# Patient Record
Sex: FEMALE | Race: WHITE | NOT HISPANIC OR LATINO
[De-identification: names, ages, dates, MRNs, and addresses within clinical notes are randomized per-mention and may not be internally consistent; named-entity substitution may affect disease eponyms.]

---

## 2017-09-06 ENCOUNTER — APPOINTMENT (OUTPATIENT)
Dept: OBGYN | Facility: CLINIC | Age: 57
End: 2017-09-06
Payer: COMMERCIAL

## 2017-09-06 VITALS
HEIGHT: 63 IN | WEIGHT: 135 LBS | BODY MASS INDEX: 23.92 KG/M2 | DIASTOLIC BLOOD PRESSURE: 70 MMHG | SYSTOLIC BLOOD PRESSURE: 115 MMHG

## 2017-09-06 DIAGNOSIS — Z78.9 OTHER SPECIFIED HEALTH STATUS: ICD-10-CM

## 2017-09-06 PROCEDURE — 82270 OCCULT BLOOD FECES: CPT

## 2017-09-06 PROCEDURE — 81003 URINALYSIS AUTO W/O SCOPE: CPT | Mod: QW

## 2017-09-06 PROCEDURE — 99396 PREV VISIT EST AGE 40-64: CPT

## 2017-09-11 LAB
CYTOLOGY CVX/VAG DOC THIN PREP: NORMAL
HPV HIGH+LOW RISK DNA PNL CVX: NEGATIVE

## 2017-10-04 ENCOUNTER — FORM ENCOUNTER (OUTPATIENT)
Age: 57
End: 2017-10-04

## 2017-10-05 ENCOUNTER — APPOINTMENT (OUTPATIENT)
Dept: RADIOLOGY | Facility: CLINIC | Age: 57
End: 2017-10-05
Payer: COMMERCIAL

## 2017-10-05 ENCOUNTER — APPOINTMENT (OUTPATIENT)
Dept: MAMMOGRAPHY | Facility: CLINIC | Age: 57
End: 2017-10-05
Payer: COMMERCIAL

## 2017-10-05 ENCOUNTER — OUTPATIENT (OUTPATIENT)
Dept: OUTPATIENT SERVICES | Facility: HOSPITAL | Age: 57
LOS: 1 days | End: 2017-10-05
Payer: COMMERCIAL

## 2017-10-05 DIAGNOSIS — Z00.8 ENCOUNTER FOR OTHER GENERAL EXAMINATION: ICD-10-CM

## 2017-10-05 PROCEDURE — 77080 DXA BONE DENSITY AXIAL: CPT

## 2017-10-05 PROCEDURE — 77063 BREAST TOMOSYNTHESIS BI: CPT

## 2017-10-05 PROCEDURE — 77063 BREAST TOMOSYNTHESIS BI: CPT | Mod: 26

## 2017-10-05 PROCEDURE — G0202: CPT | Mod: 26

## 2017-10-05 PROCEDURE — 77080 DXA BONE DENSITY AXIAL: CPT | Mod: 26

## 2017-10-05 PROCEDURE — 77067 SCR MAMMO BI INCL CAD: CPT

## 2017-10-09 ENCOUNTER — RESULT REVIEW (OUTPATIENT)
Age: 57
End: 2017-10-09

## 2018-10-01 ENCOUNTER — APPOINTMENT (OUTPATIENT)
Dept: OBGYN | Facility: CLINIC | Age: 58
End: 2018-10-01
Payer: COMMERCIAL

## 2018-10-01 VITALS
HEART RATE: 74 BPM | HEIGHT: 63 IN | WEIGHT: 140 LBS | BODY MASS INDEX: 24.8 KG/M2 | DIASTOLIC BLOOD PRESSURE: 72 MMHG | RESPIRATION RATE: 16 BRPM | SYSTOLIC BLOOD PRESSURE: 116 MMHG

## 2018-10-01 PROCEDURE — 99396 PREV VISIT EST AGE 40-64: CPT

## 2018-10-01 PROCEDURE — 82270 OCCULT BLOOD FECES: CPT

## 2018-10-19 LAB
CYTOLOGY CVX/VAG DOC THIN PREP: NORMAL
HPV HIGH+LOW RISK DNA PNL CVX: NOT DETECTED

## 2020-08-18 ENCOUNTER — APPOINTMENT (OUTPATIENT)
Dept: OBGYN | Facility: CLINIC | Age: 60
End: 2020-08-18
Payer: COMMERCIAL

## 2020-08-18 VITALS
BODY MASS INDEX: 24.8 KG/M2 | HEIGHT: 63 IN | TEMPERATURE: 98.1 F | HEART RATE: 72 BPM | RESPIRATION RATE: 16 BRPM | OXYGEN SATURATION: 99 % | DIASTOLIC BLOOD PRESSURE: 72 MMHG | WEIGHT: 140 LBS | SYSTOLIC BLOOD PRESSURE: 110 MMHG

## 2020-08-18 DIAGNOSIS — N95.2 POSTMENOPAUSAL ATROPHIC VAGINITIS: ICD-10-CM

## 2020-08-18 DIAGNOSIS — Z01.419 ENCOUNTER FOR GYNECOLOGICAL EXAMINATION (GENERAL) (ROUTINE) W/OUT ABNORMAL FINDINGS: ICD-10-CM

## 2020-08-18 PROCEDURE — 99396 PREV VISIT EST AGE 40-64: CPT

## 2020-08-18 PROCEDURE — 82270 OCCULT BLOOD FECES: CPT

## 2020-08-18 NOTE — PHYSICAL EXAM
[Awake] : awake [Acute Distress] : no acute distress [Alert] : alert [Mass] : no breast mass [Soft] : soft [Axillary LAD] : no axillary lymphadenopathy [Nipple Discharge] : no nipple discharge [Tender] : non tender [Oriented x3] : oriented to person, place, and time [Vulvar Atrophy] : vulvar atrophy [Normal] : cervix [Atrophy] : atrophy [No Bleeding] : there was no active vaginal bleeding [Pap Obtained] : a Pap smear was performed [Uterine Adnexae] : were not tender and not enlarged [No Tenderness] : no rectal tenderness [Occult Blood] : occult blood test from digital rectal exam was negative [Nl Sphincter Tone] : normal sphincter tone

## 2020-08-18 NOTE — CHIEF COMPLAINT
[Annual Visit] : annual visit [FreeTextEntry1] : Patient is a 59-year-old female presents for routine annual gynecologic examination. This patient has no complaints. Patient's last mammogram was 2017 as was her last bone density test.

## 2020-08-19 LAB — HPV HIGH+LOW RISK DNA PNL CVX: NOT DETECTED

## 2023-01-28 ENCOUNTER — OUTPATIENT (OUTPATIENT)
Dept: OUTPATIENT SERVICES | Facility: HOSPITAL | Age: 63
LOS: 1 days | Discharge: HOME | End: 2023-01-28

## 2023-01-28 DIAGNOSIS — R22.1 LOCALIZED SWELLING, MASS AND LUMP, NECK: ICD-10-CM

## 2023-01-31 DIAGNOSIS — Z01.419 ENCOUNTER FOR GYNECOLOGICAL EXAMINATION (GENERAL) (ROUTINE) W/OUT ABNORMAL FINDINGS: ICD-10-CM

## 2023-01-31 DIAGNOSIS — N95.2 POSTMENOPAUSAL ATROPHIC VAGINITIS: ICD-10-CM

## 2023-02-01 ENCOUNTER — RESULT REVIEW (OUTPATIENT)
Age: 63
End: 2023-02-01

## 2023-02-01 ENCOUNTER — APPOINTMENT (OUTPATIENT)
Dept: HEMATOLOGY ONCOLOGY | Facility: CLINIC | Age: 63
End: 2023-02-01
Payer: COMMERCIAL

## 2023-02-01 VITALS
HEIGHT: 62.99 IN | BODY MASS INDEX: 25.06 KG/M2 | TEMPERATURE: 97.8 F | WEIGHT: 141.44 LBS | SYSTOLIC BLOOD PRESSURE: 185 MMHG | DIASTOLIC BLOOD PRESSURE: 133 MMHG | RESPIRATION RATE: 18 BRPM | HEART RATE: 99 BPM | OXYGEN SATURATION: 99 %

## 2023-02-01 DIAGNOSIS — I10 ESSENTIAL (PRIMARY) HYPERTENSION: ICD-10-CM

## 2023-02-01 DIAGNOSIS — F17.200 NICOTINE DEPENDENCE, UNSPECIFIED, UNCOMPLICATED: ICD-10-CM

## 2023-02-01 LAB
BASOPHILS # BLD AUTO: 0.06 K/UL — SIGNIFICANT CHANGE UP (ref 0–0.2)
BASOPHILS NFR BLD AUTO: 0.8 % — SIGNIFICANT CHANGE UP (ref 0–2)
EOSINOPHIL # BLD AUTO: 0.16 K/UL — SIGNIFICANT CHANGE UP (ref 0–0.5)
EOSINOPHIL NFR BLD AUTO: 2 % — SIGNIFICANT CHANGE UP (ref 0–6)
HCT VFR BLD CALC: 40 % — SIGNIFICANT CHANGE UP (ref 34.5–45)
HGB BLD-MCNC: 13.2 G/DL — SIGNIFICANT CHANGE UP (ref 11.5–15.5)
IMM GRANULOCYTES NFR BLD AUTO: 0.4 % — SIGNIFICANT CHANGE UP (ref 0–0.9)
LYMPHOCYTES # BLD AUTO: 2.44 K/UL — SIGNIFICANT CHANGE UP (ref 1–3.3)
LYMPHOCYTES # BLD AUTO: 31.1 % — SIGNIFICANT CHANGE UP (ref 13–44)
MCHC RBC-ENTMCNC: 29.3 PG — SIGNIFICANT CHANGE UP (ref 27–34)
MCHC RBC-ENTMCNC: 33 GM/DL — SIGNIFICANT CHANGE UP (ref 32–36)
MCV RBC AUTO: 88.7 FL — SIGNIFICANT CHANGE UP (ref 80–100)
MONOCYTES # BLD AUTO: 0.57 K/UL — SIGNIFICANT CHANGE UP (ref 0–0.9)
MONOCYTES NFR BLD AUTO: 7.3 % — SIGNIFICANT CHANGE UP (ref 2–14)
NEUTROPHILS # BLD AUTO: 4.58 K/UL — SIGNIFICANT CHANGE UP (ref 1.8–7.4)
NEUTROPHILS NFR BLD AUTO: 58.4 % — SIGNIFICANT CHANGE UP (ref 43–77)
NRBC # BLD: 0 /100 WBCS — SIGNIFICANT CHANGE UP (ref 0–0)
PLATELET # BLD AUTO: 301 K/UL — SIGNIFICANT CHANGE UP (ref 150–400)
RBC # BLD: 4.51 M/UL — SIGNIFICANT CHANGE UP (ref 3.8–5.2)
RBC # FLD: 12.7 % — SIGNIFICANT CHANGE UP (ref 10.3–14.5)
WBC # BLD: 7.84 K/UL — SIGNIFICANT CHANGE UP (ref 3.8–10.5)
WBC # FLD AUTO: 7.84 K/UL — SIGNIFICANT CHANGE UP (ref 3.8–10.5)

## 2023-02-01 PROCEDURE — 99205 OFFICE O/P NEW HI 60 MIN: CPT

## 2023-02-02 DIAGNOSIS — C85.10 UNSPECIFIED B-CELL LYMPHOMA, UNSPECIFIED SITE: ICD-10-CM

## 2023-02-02 DIAGNOSIS — I10 ESSENTIAL (PRIMARY) HYPERTENSION: ICD-10-CM

## 2023-02-02 PROBLEM — F17.200 SMOKER: Status: ACTIVE | Noted: 2020-08-18

## 2023-02-02 LAB
ALBUMIN SERPL ELPH-MCNC: 4.3 G/DL — SIGNIFICANT CHANGE UP (ref 3.3–5)
ALP SERPL-CCNC: 84 U/L — SIGNIFICANT CHANGE UP (ref 40–120)
ALT FLD-CCNC: 11 U/L — SIGNIFICANT CHANGE UP (ref 10–45)
ANION GAP SERPL CALC-SCNC: 11 MMOL/L — SIGNIFICANT CHANGE UP (ref 5–17)
AST SERPL-CCNC: 13 U/L — SIGNIFICANT CHANGE UP (ref 10–40)
BILIRUB SERPL-MCNC: <0.2 MG/DL — SIGNIFICANT CHANGE UP (ref 0.2–1.2)
BUN SERPL-MCNC: 11 MG/DL — SIGNIFICANT CHANGE UP (ref 7–23)
CALCIUM SERPL-MCNC: 9.8 MG/DL — SIGNIFICANT CHANGE UP (ref 8.4–10.5)
CHLORIDE SERPL-SCNC: 102 MMOL/L — SIGNIFICANT CHANGE UP (ref 96–108)
CO2 SERPL-SCNC: 28 MMOL/L — SIGNIFICANT CHANGE UP (ref 22–31)
CREAT SERPL-MCNC: 0.95 MG/DL — SIGNIFICANT CHANGE UP (ref 0.5–1.3)
EGFR: 68 ML/MIN/1.73M2 — SIGNIFICANT CHANGE UP
GLUCOSE SERPL-MCNC: 97 MG/DL — SIGNIFICANT CHANGE UP (ref 70–99)
HBV CORE AB SER-ACNC: SIGNIFICANT CHANGE UP
HBV SURFACE AB SER-ACNC: SIGNIFICANT CHANGE UP
HBV SURFACE AG SER-ACNC: SIGNIFICANT CHANGE UP
HCV AB S/CO SERPL IA: 0.06 S/CO — SIGNIFICANT CHANGE UP (ref 0–0.99)
HCV AB SERPL-IMP: SIGNIFICANT CHANGE UP
LDH SERPL L TO P-CCNC: 154 U/L — SIGNIFICANT CHANGE UP (ref 50–242)
POTASSIUM SERPL-MCNC: 4.3 MMOL/L — SIGNIFICANT CHANGE UP (ref 3.5–5.3)
POTASSIUM SERPL-SCNC: 4.3 MMOL/L — SIGNIFICANT CHANGE UP (ref 3.5–5.3)
PROT SERPL-MCNC: 6.7 G/DL — SIGNIFICANT CHANGE UP (ref 6–8.3)
SODIUM SERPL-SCNC: 140 MMOL/L — SIGNIFICANT CHANGE UP (ref 135–145)
URATE SERPL-MCNC: 3.3 MG/DL — SIGNIFICANT CHANGE UP (ref 2.5–7)

## 2023-02-02 NOTE — ASSESSMENT
[FreeTextEntry1] : 63 y/o female with  small  right cervical adenopathy. FNA showed monoclonal  CD 10 + , CD 20 +, CD 5 - , CD 23 - population of B lymphocytes suggestive  of lymphoproliferative disorder - differential will include follicular lymphoma, versus large B cell lymphoma.\par PET scan confirmed  low volume and very mildly avid R cervical adenopathy and questionable left inguinal adenopathy. \par Clinical presentation suggestive of low grade follicular lymphoma, with large B cell lymphoma in differential but much less likely.\par For diagnosis she needs core biopsy of R cervical adenopathy.\par \par Will refer to IR for US guided LN biopsy R  posterior neck - once BP better controlled. .\par LDH/ chemistries, Hep B and C profile are pending.\par \par Hypertensive urgency.\par \par She has  high BP - repeated multiple times - range 180/120. Except for hx of recurrent headaches - no symptoms indicating end organ damage. \par Will start amlodipine 5 mg today. Needs to follow up with her primary physician tomorrow. Instructed to go to ER if chest pain or severe  headache/ dizziness. \par \par Return visit after LN biopsy.\par \par D/w the patient  and her daughter Ирина ( medicine/ cardiology NP) \par \par \par \par \par \par CC: Dr Lance Lema\par       Ирина Ny NP

## 2023-02-02 NOTE — HISTORY OF PRESENT ILLNESS
[de-identified] : 63 y/o female noticed slightly  swollen/ palpable  glands in her posterior right neck for few months. Not getting larger, no pain. Has no throat , ear or dental issues. \par No fevers. No night sweat. No weight loss. No pruritus. \par C/o chronic fatigue for over one year. Partially contributes to depression since she lost her son three years ago.\par Has intermitted headaches.for several months. She is a smoker 1/2 PPD for at least 40 years. Has mild chronic nonproductive cough. \par \par 1/23/23 FNA of right posterior cervical adenopathy  ( done at [facility]) : atypical , mixed population of predominantly small lymphocytes with irregular nuclear contour.\par              Flow cytometry : atypical population of CD10 + ( CD 20 +,  CD 5 -; CD23 -)  B cells ( 47%)  c/w atypical CD10 lymphoproliferative disorder.\par \par PET scan 1/28/23 ( [facility])  atypical small LNs right posterior neck ( 1- 0.9 cm with SUV 4.4-2.8) and left inguinal ( 1.1 cm SUV 2.7 )   4.7 cm R adnexal cyst.

## 2023-02-02 NOTE — REVIEW OF SYSTEMS
[Patient Intake Form Reviewed] : Patient intake form was reviewed [Fatigue] : fatigue [Cough] : cough [Depression] : depression [Swollen Glands] : swollen glands [Negative] : Allergic/Immunologic [de-identified] : chronic headaches

## 2023-02-02 NOTE — PHYSICAL EXAM
[Fully active, able to carry on all pre-disease performance without restriction] : Status 0 - Fully active, able to carry on all pre-disease performance without restriction [Normal] : grossly intact [de-identified] : small firm R posterior cervical LN  [de-identified] : small  firm R posterior  cervical LN,  inguinal LN  soft and small  [de-identified] : appears depressed

## 2023-02-02 NOTE — REASON FOR VISIT
[Initial Consultation] : an initial consultation for [FreeTextEntry2] : B cell lymphoproliferative disorder

## 2023-02-02 NOTE — CONSULT LETTER
[Dear  ___] : Dear ~RADHA, [( Thank you for referring [unfilled] for consultation for _____ )] : Thank you for referring [unfilled] for consultation for [unfilled] [Please see my note below.] : Please see my note below. [Consult Closing:] : Thank you very much for allowing me to participate in the care of this patient.  If you have any questions, please do not hesitate to contact me. [Sincerely,] : Sincerely, [FreeTextEntry2] : Ирина Fine, NP  [FreeTextEntry3] : Jia Figueredo

## 2023-02-17 NOTE — ASU PATIENT PROFILE, ADULT - FALL HARM RISK - RISK INTERVENTIONS

## 2023-02-17 NOTE — ASU PATIENT PROFILE, ADULT - NSICDXPASTMEDICALHX_GEN_ALL_CORE_FT
PAST MEDICAL HISTORY:  Cervical adenopathy     H/O: depression     HTN (hypertension)     Lymphoma     Vaginal atrophy

## 2023-02-19 ENCOUNTER — NON-APPOINTMENT (OUTPATIENT)
Age: 63
End: 2023-02-19

## 2023-02-21 ENCOUNTER — RESULT REVIEW (OUTPATIENT)
Age: 63
End: 2023-02-21

## 2023-02-21 ENCOUNTER — OUTPATIENT (OUTPATIENT)
Dept: INPATIENT UNIT | Facility: HOSPITAL | Age: 63
LOS: 1 days | Discharge: HOME | End: 2023-02-21
Payer: COMMERCIAL

## 2023-02-21 ENCOUNTER — TRANSCRIPTION ENCOUNTER (OUTPATIENT)
Age: 63
End: 2023-02-21

## 2023-02-21 VITALS
HEIGHT: 64 IN | DIASTOLIC BLOOD PRESSURE: 78 MMHG | HEART RATE: 87 BPM | TEMPERATURE: 97 F | WEIGHT: 141.98 LBS | OXYGEN SATURATION: 99 % | SYSTOLIC BLOOD PRESSURE: 98 MMHG | RESPIRATION RATE: 14 BRPM

## 2023-02-21 VITALS
SYSTOLIC BLOOD PRESSURE: 108 MMHG | HEART RATE: 71 BPM | RESPIRATION RATE: 16 BRPM | OXYGEN SATURATION: 97 % | DIASTOLIC BLOOD PRESSURE: 84 MMHG

## 2023-02-21 DIAGNOSIS — C85.10 UNSPECIFIED B-CELL LYMPHOMA, UNSPECIFIED SITE: ICD-10-CM

## 2023-02-21 DIAGNOSIS — Z98.890 OTHER SPECIFIED POSTPROCEDURAL STATES: Chronic | ICD-10-CM

## 2023-02-21 PROCEDURE — 88189 FLOWCYTOMETRY/READ 16 & >: CPT

## 2023-02-21 PROCEDURE — 76942 ECHO GUIDE FOR BIOPSY: CPT | Mod: 26

## 2023-02-21 PROCEDURE — 88185 FLOWCYTOMETRY/TC ADD-ON: CPT

## 2023-02-21 PROCEDURE — 88305 TISSUE EXAM BY PATHOLOGIST: CPT | Mod: 26

## 2023-02-21 PROCEDURE — 88305 TISSUE EXAM BY PATHOLOGIST: CPT

## 2023-02-21 PROCEDURE — 88184 FLOWCYTOMETRY/ TC 1 MARKER: CPT

## 2023-02-21 PROCEDURE — 38505 NEEDLE BIOPSY LYMPH NODES: CPT

## 2023-02-21 PROCEDURE — 88172 CYTP DX EVAL FNA 1ST EA SITE: CPT

## 2023-02-21 PROCEDURE — 76942 ECHO GUIDE FOR BIOPSY: CPT

## 2023-02-21 RX ORDER — OXYCODONE HYDROCHLORIDE 5 MG/1
5 TABLET ORAL ONCE
Refills: 0 | Status: DISCONTINUED | OUTPATIENT
Start: 2023-02-21 | End: 2023-02-21

## 2023-02-21 RX ORDER — OXYCODONE HYDROCHLORIDE 5 MG/1
10 TABLET ORAL ONCE
Refills: 0 | Status: DISCONTINUED | OUTPATIENT
Start: 2023-02-21 | End: 2023-02-21

## 2023-02-21 RX ORDER — SODIUM CHLORIDE 9 MG/ML
1000 INJECTION, SOLUTION INTRAVENOUS
Refills: 0 | Status: DISCONTINUED | OUTPATIENT
Start: 2023-02-21 | End: 2023-02-21

## 2023-02-21 RX ORDER — FENTANYL CITRATE 50 UG/ML
50 INJECTION INTRAVENOUS
Refills: 0 | Status: DISCONTINUED | OUTPATIENT
Start: 2023-02-21 | End: 2023-02-21

## 2023-02-21 RX ORDER — ONDANSETRON 8 MG/1
4 TABLET, FILM COATED ORAL ONCE
Refills: 0 | Status: DISCONTINUED | OUTPATIENT
Start: 2023-02-21 | End: 2023-02-21

## 2023-02-21 RX ORDER — LAMOTRIGINE 25 MG/1
0 TABLET, ORALLY DISINTEGRATING ORAL
Qty: 0 | Refills: 0 | DISCHARGE

## 2023-02-21 RX ORDER — BUPROPION HYDROCHLORIDE 150 MG/1
0 TABLET, EXTENDED RELEASE ORAL
Qty: 0 | Refills: 0 | DISCHARGE

## 2023-02-21 NOTE — ASU DISCHARGE PLAN (ADULT/PEDIATRIC) - NS MD DC FALL RISK RISK
For information on Fall & Injury Prevention, visit: [email or web address] OR  [email or web address] OR  https://www.cdc.gov/steadi/patient.html

## 2023-02-27 DIAGNOSIS — R59.0 LOCALIZED ENLARGED LYMPH NODES: ICD-10-CM

## 2023-02-27 LAB — SURGICAL PATHOLOGY STUDY: SIGNIFICANT CHANGE UP

## 2023-03-01 ENCOUNTER — APPOINTMENT (OUTPATIENT)
Dept: HEMATOLOGY ONCOLOGY | Facility: CLINIC | Age: 63
End: 2023-03-01

## 2023-03-01 ENCOUNTER — NON-APPOINTMENT (OUTPATIENT)
Age: 63
End: 2023-03-01

## 2023-03-03 PROBLEM — N95.2 POSTMENOPAUSAL ATROPHIC VAGINITIS: Chronic | Status: ACTIVE | Noted: 2023-02-17

## 2023-03-03 PROBLEM — Z86.59 PERSONAL HISTORY OF OTHER MENTAL AND BEHAVIORAL DISORDERS: Chronic | Status: ACTIVE | Noted: 2023-02-17

## 2023-03-03 PROBLEM — I10 ESSENTIAL (PRIMARY) HYPERTENSION: Chronic | Status: ACTIVE | Noted: 2023-02-17

## 2023-03-03 PROBLEM — C85.90 NON-HODGKIN LYMPHOMA, UNSPECIFIED, UNSPECIFIED SITE: Chronic | Status: ACTIVE | Noted: 2023-02-17

## 2023-03-03 PROBLEM — R59.0 LOCALIZED ENLARGED LYMPH NODES: Chronic | Status: ACTIVE | Noted: 2023-02-17

## 2023-03-06 ENCOUNTER — APPOINTMENT (OUTPATIENT)
Dept: OTOLARYNGOLOGY | Facility: CLINIC | Age: 63
End: 2023-03-06
Payer: COMMERCIAL

## 2023-03-06 VITALS
WEIGHT: 140 LBS | SYSTOLIC BLOOD PRESSURE: 130 MMHG | BODY MASS INDEX: 24.8 KG/M2 | HEIGHT: 63 IN | DIASTOLIC BLOOD PRESSURE: 86 MMHG | TEMPERATURE: 96.4 F | HEART RATE: 84 BPM

## 2023-03-06 DIAGNOSIS — C85.10 UNSPECIFIED B-CELL LYMPHOMA, UNSPECIFIED SITE: ICD-10-CM

## 2023-03-06 PROCEDURE — 99204 OFFICE O/P NEW MOD 45 MIN: CPT

## 2023-03-06 NOTE — PHYSICAL EXAM
[de-identified] : Palpable LN in right level V measuring approx. 1.5 cm, firm, mobile, no TTP. [Laryngoscopy Performed] : laryngoscopy was performed, see procedure section for findings [FreeTextEntry1] : No concerning lesions in the OC/OPx on inspection or palpation.  No obvious tonsil lesion. [Normal] : no rashes [de-identified] : No other LAD.

## 2023-03-06 NOTE — HISTORY OF PRESENT ILLNESS
[de-identified] : Patient referred by Dr. Figueredo for excisional biopsy of right cervical lymph node for further work up for lymphoma. pt had  a biospy on 1/24/2023 of right cervical lymph node- limited tissue- suggestive of follicular lymphoma and repeat IR bx slide showed B-CELL LYMPHOMA. PET CT on 1/2023 showed right neck node activities and asymmetrical tonsil. \par First noticed 6 months ago. Denies of change in size, pain, dysphagia, odynophagia, dyspnea, dysphonia, bleeding, fever, weakness or weight loss.\par \par

## 2023-03-09 ENCOUNTER — NON-APPOINTMENT (OUTPATIENT)
Age: 63
End: 2023-03-09

## 2023-03-15 ENCOUNTER — TRANSCRIPTION ENCOUNTER (OUTPATIENT)
Age: 63
End: 2023-03-15

## 2023-03-16 ENCOUNTER — RESULT REVIEW (OUTPATIENT)
Age: 63
End: 2023-03-16

## 2023-03-16 ENCOUNTER — TRANSCRIPTION ENCOUNTER (OUTPATIENT)
Age: 63
End: 2023-03-16

## 2023-03-16 ENCOUNTER — APPOINTMENT (OUTPATIENT)
Dept: OTOLARYNGOLOGY | Facility: HOSPITAL | Age: 63
End: 2023-03-16

## 2023-03-16 ENCOUNTER — OUTPATIENT (OUTPATIENT)
Dept: OUTPATIENT SERVICES | Facility: HOSPITAL | Age: 63
LOS: 1 days | Discharge: HOME | End: 2023-03-16
Payer: COMMERCIAL

## 2023-03-16 VITALS
HEART RATE: 66 BPM | TEMPERATURE: 97 F | RESPIRATION RATE: 12 BRPM | SYSTOLIC BLOOD PRESSURE: 115 MMHG | OXYGEN SATURATION: 98 % | DIASTOLIC BLOOD PRESSURE: 66 MMHG

## 2023-03-16 VITALS
HEIGHT: 63 IN | OXYGEN SATURATION: 100 % | WEIGHT: 139.99 LBS | RESPIRATION RATE: 17 BRPM | SYSTOLIC BLOOD PRESSURE: 133 MMHG | TEMPERATURE: 98 F | HEART RATE: 84 BPM | DIASTOLIC BLOOD PRESSURE: 88 MMHG

## 2023-03-16 DIAGNOSIS — Z98.890 OTHER SPECIFIED POSTPROCEDURAL STATES: Chronic | ICD-10-CM

## 2023-03-16 DIAGNOSIS — R22.1 LOCALIZED SWELLING, MASS AND LUMP, NECK: ICD-10-CM

## 2023-03-16 PROCEDURE — 38510 BIOPSY/REMOVAL LYMPH NODES: CPT | Mod: GC

## 2023-03-16 PROCEDURE — 88342 IMHCHEM/IMCYTCHM 1ST ANTB: CPT | Mod: 26,59

## 2023-03-16 PROCEDURE — 88307 TISSUE EXAM BY PATHOLOGIST: CPT | Mod: 26

## 2023-03-16 PROCEDURE — 88341 IMHCHEM/IMCYTCHM EA ADD ANTB: CPT | Mod: 26

## 2023-03-16 PROCEDURE — 88189 FLOWCYTOMETRY/READ 16 & >: CPT

## 2023-03-16 DEVICE — LIGATING CLIPS WECK HORIZON SMALL-WIDE (RED) 24: Type: IMPLANTABLE DEVICE | Site: RIGHT | Status: FUNCTIONAL

## 2023-03-16 DEVICE — SURGICEL 2 X 14": Type: IMPLANTABLE DEVICE | Site: RIGHT | Status: FUNCTIONAL

## 2023-03-16 DEVICE — LIGATING CLIPS WECK HORIZON MEDIUM (BLUE) 24: Type: IMPLANTABLE DEVICE | Site: RIGHT | Status: FUNCTIONAL

## 2023-03-16 RX ORDER — IBUPROFEN 200 MG
1 TABLET ORAL
Qty: 20 | Refills: 0
Start: 2023-03-16 | End: 2023-03-20

## 2023-03-16 RX ORDER — BREXPIPRAZOLE 0.25 MG/1
0 TABLET ORAL
Qty: 0 | Refills: 0 | DISCHARGE

## 2023-03-16 RX ORDER — ONDANSETRON 8 MG/1
4 TABLET, FILM COATED ORAL ONCE
Refills: 0 | Status: DISCONTINUED | OUTPATIENT
Start: 2023-03-16 | End: 2023-03-30

## 2023-03-16 RX ORDER — OXYCODONE HYDROCHLORIDE 5 MG/1
5 TABLET ORAL ONCE
Refills: 0 | Status: DISCONTINUED | OUTPATIENT
Start: 2023-03-16 | End: 2023-03-16

## 2023-03-16 RX ORDER — ALPRAZOLAM 0.25 MG
1 TABLET ORAL
Qty: 0 | Refills: 0 | DISCHARGE

## 2023-03-16 RX ORDER — SODIUM CHLORIDE 9 MG/ML
500 INJECTION, SOLUTION INTRAVENOUS ONCE
Refills: 0 | Status: COMPLETED | OUTPATIENT
Start: 2023-03-16 | End: 2023-03-16

## 2023-03-16 RX ORDER — FENTANYL CITRATE 50 UG/ML
50 INJECTION INTRAVENOUS
Refills: 0 | Status: DISCONTINUED | OUTPATIENT
Start: 2023-03-16 | End: 2023-03-16

## 2023-03-16 RX ORDER — BACITRACIN ZINC 500 UNIT/G
1 OINTMENT IN PACKET (EA) TOPICAL
Qty: 5 | Refills: 0
Start: 2023-03-16 | End: 2023-03-20

## 2023-03-16 RX ORDER — ALBUMIN HUMAN 25 %
250 VIAL (ML) INTRAVENOUS ONCE
Refills: 0 | Status: COMPLETED | OUTPATIENT
Start: 2023-03-16 | End: 2023-03-16

## 2023-03-16 RX ORDER — ACETAMINOPHEN 500 MG
650 TABLET ORAL EVERY 6 HOURS
Refills: 0 | Status: DISCONTINUED | OUTPATIENT
Start: 2023-03-16 | End: 2023-03-30

## 2023-03-16 RX ORDER — ACETAMINOPHEN 500 MG
650 TABLET ORAL ONCE
Refills: 0 | Status: DISCONTINUED | OUTPATIENT
Start: 2023-03-16 | End: 2023-03-30

## 2023-03-16 RX ORDER — OLMESARTAN MEDOXOMIL 5 MG/1
0 TABLET, FILM COATED ORAL
Qty: 0 | Refills: 0 | DISCHARGE

## 2023-03-16 RX ORDER — BREXPIPRAZOLE 0.25 MG/1
1 TABLET ORAL
Qty: 0 | Refills: 0 | DISCHARGE

## 2023-03-16 RX ADMIN — OXYCODONE HYDROCHLORIDE 5 MILLIGRAM(S): 5 TABLET ORAL at 13:58

## 2023-03-16 RX ADMIN — FENTANYL CITRATE 50 MICROGRAM(S): 50 INJECTION INTRAVENOUS at 13:45

## 2023-03-16 RX ADMIN — Medication 125 MILLILITER(S): at 13:39

## 2023-03-16 RX ADMIN — FENTANYL CITRATE 50 MICROGRAM(S): 50 INJECTION INTRAVENOUS at 13:32

## 2023-03-16 RX ADMIN — FENTANYL CITRATE 50 MICROGRAM(S): 50 INJECTION INTRAVENOUS at 13:15

## 2023-03-16 RX ADMIN — SODIUM CHLORIDE 1000 MILLILITER(S): 9 INJECTION, SOLUTION INTRAVENOUS at 13:00

## 2023-03-16 RX ADMIN — FENTANYL CITRATE 50 MICROGRAM(S): 50 INJECTION INTRAVENOUS at 13:00

## 2023-03-16 NOTE — ASU DISCHARGE PLAN (ADULT/PEDIATRIC) - FOLLOW UP APPOINTMENTS
may also call Recovery Room (PACU) 24/7 @ [phone]/[facility]

## 2023-03-16 NOTE — ASU DISCHARGE PLAN (ADULT/PEDIATRIC) - ASU DC SPECIAL INSTRUCTIONSFT
keep the incision dry for 48 hours. after that you can let soapy water run over the incision, but do not scrub the incision.     follow up with Dr. Pendleton to remove the sutures.     apply bacitracin to the incision once per day for 5 days.    take 1000mg Tylenol every 6 hours and 600mg of Ibuprofen every 6 hours for pain. You can alternate these every 3 hours, i.e. Tylenol at 12pm, Ibuprofen at 3pm, Tylenol at 6pm. This will ensure maximal pain control.

## 2023-03-16 NOTE — H&P ADULT - HISTORY OF PRESENT ILLNESS
Patient is a 62 year old male with history of enlarged right cervical lymph node who presents for excision lymph node biopsy. Core biopsy 1/24/23 c/f follicular lymphoma. Repeat IR guided bx c/f B cell lymphoma. PET CT 1/20/23 shows right neck node activity and asymmetric tonsils. Denies change in size, pain, dysphagia, odynophagia, dyspnea dysphonia, bleeding, fever, weakness or weight loss.     ICU Vital Signs Last 24 Hrs  T(C): 36.8 (16 Mar 2023 09:17), Max: 36.8 (16 Mar 2023 09:17)  T(F): 98.2 (16 Mar 2023 09:17), Max: 98.2 (16 Mar 2023 09:17)  HR: 84 (16 Mar 2023 09:17) (84 - 84)  BP: 133/88 (16 Mar 2023 09:17) (133/88 - 133/88)  BP(mean): --  ABP: --  ABP(mean): --  RR: 17 (16 Mar 2023 09:17) (17 - 17)  SpO2: 100% (16 Mar 2023 09:17) (100% - 100%)    General: NAD, A+Ox3  Respiratory: No respiratory distress, stridor, or stertor  Voice quality: normal  Face:  Symmetric without masses or lesions  OU: EOMI  AD: Pinna wnl  AS: Pinna wnl  Nose: nasal cavity clear bilaterally, inferior turbinates normal, mucosa normal without crusting or bleeding  OC/OP: tongue normal, floor of mouth WNL, no masses or lesions, OP clear  Neck: Palpable R level 5 LN, non tender  Neuro: CNII-XII intact    A/P: 62 year old male with history of enlarged right cervical lymph node who presents for excision lymph node biopsy   - OR for excision LN biopsy   - Anticipated dispo: PACU > Home

## 2023-03-16 NOTE — ASU DISCHARGE PLAN (ADULT/PEDIATRIC) - CALL YOUR DOCTOR IF YOU HAVE ANY OF THE FOLLOWING:
Swelling that gets worse/Wound/Surgical Site with redness, or foul smelling discharge or pus Bleeding that does not stop/Swelling that gets worse/Pain not relieved by Medications/Fever greater than (need to indicate Fahrenheit or Celsius)/Wound/Surgical Site with redness, or foul smelling discharge or pus/Numbness, tingling, color or temperature change to extremity/Nausea and vomiting that does not stop/Unable to urinate/Excessive diarrhea/Inability to tolerate liquids or foods/Increased irritability or sluggishness

## 2023-03-17 LAB — TM INTERPRETATION: SIGNIFICANT CHANGE UP

## 2023-03-18 RX ORDER — OXYCODONE HYDROCHLORIDE 5 MG/1
1 TABLET ORAL
Qty: 8 | Refills: 0
Start: 2023-03-18

## 2023-03-22 LAB — HEMATOPATHOLOGY REPORT: SIGNIFICANT CHANGE UP

## 2023-03-23 ENCOUNTER — APPOINTMENT (OUTPATIENT)
Dept: OTOLARYNGOLOGY | Facility: CLINIC | Age: 63
End: 2023-03-23
Payer: COMMERCIAL

## 2023-03-23 PROCEDURE — 99024 POSTOP FOLLOW-UP VISIT: CPT

## 2023-03-23 NOTE — CONSULT LETTER
[Dear  ___] : Dear  [unfilled], [Courtesy Letter:] : I had the pleasure of seeing your patient, [unfilled], in my office today. [Please see my note below.] : Please see my note below. [Referral Closing:] : Thank you very much for seeing this patient.  If you have any questions, please do not hesitate to contact me. [Sincerely,] : Sincerely, [FreeTextEntry2] : Alfreda Mathews MD [FreeTextEntry3] : Megan Parker NP\par Sohan Pendleton MD, FACS\par [facility]\par [address]\[place] [address]\par Tel: [phone]\par

## 2023-03-23 NOTE — HISTORY OF PRESENT ILLNESS
[None] : No associated symptoms are reported. [de-identified] : Ms. Oneal is s/p excisional biopsy right posterior neck node on 3/16/2023 with Dr. EMMA Pendleton. Presents today for incision assessment and suture removal.  Reports feeling well. Denies any fever. On 3/31/2023 appt. with Dr. Mathews ([facility])

## 2023-03-23 NOTE — REVIEW OF SYSTEMS
Left a VM for patient regarding genetic testing and counseling. Will follow up.    [As Noted in HPI] : as noted in HPI [Negative] : Heme/Lymph

## 2023-03-29 ENCOUNTER — OUTPATIENT (OUTPATIENT)
Dept: OUTPATIENT SERVICES | Facility: HOSPITAL | Age: 63
LOS: 1 days | Discharge: HOME | End: 2023-03-29

## 2023-03-29 DIAGNOSIS — R22.1 LOCALIZED SWELLING, MASS AND LUMP, NECK: ICD-10-CM

## 2023-03-29 DIAGNOSIS — Z98.890 OTHER SPECIFIED POSTPROCEDURAL STATES: Chronic | ICD-10-CM

## 2023-03-29 LAB — CHROM ANALY OVERALL INTERP SPEC-IMP: SIGNIFICANT CHANGE UP

## 2023-03-31 ENCOUNTER — APPOINTMENT (OUTPATIENT)
Dept: HEMATOLOGY ONCOLOGY | Facility: CLINIC | Age: 63
End: 2023-03-31
Payer: COMMERCIAL

## 2023-03-31 VITALS
BODY MASS INDEX: 25.25 KG/M2 | SYSTOLIC BLOOD PRESSURE: 112 MMHG | TEMPERATURE: 98 F | HEART RATE: 87 BPM | RESPIRATION RATE: 18 BRPM | DIASTOLIC BLOOD PRESSURE: 77 MMHG | WEIGHT: 142.56 LBS | OXYGEN SATURATION: 97 %

## 2023-03-31 PROCEDURE — 99214 OFFICE O/P EST MOD 30 MIN: CPT

## 2023-03-31 RX ORDER — OLMESARTAN MEDOXOMIL-HYDROCHLOROTHIAZIDE 12.5; 2 MG/1; MG/1
20-12.5 TABLET, FILM COATED ORAL
Refills: 0 | Status: ACTIVE | COMMUNITY

## 2023-03-31 RX ORDER — AMLODIPINE BESYLATE 5 MG/1
5 TABLET ORAL DAILY
Qty: 14 | Refills: 0 | Status: DISCONTINUED | COMMUNITY
Start: 2023-02-01 | End: 2023-03-31

## 2023-04-03 DIAGNOSIS — C82.90 FOLLICULAR LYMPHOMA, UNSPECIFIED, UNSPECIFIED SITE: ICD-10-CM

## 2023-06-29 NOTE — HISTORY OF PRESENT ILLNESS
[de-identified] : Ms. Oneal is s/p excisional biopsy right posterior neck node on 3/16/2023 with path showed Follicular lymphoma, low-grade.  Pt saw Dr. Mathews ([facility]) on 3/31/2023 and not treatment intervention need now. Pt is healing well and has no c.o, no new lesion. \par  [None] : No associated symptoms are reported.

## 2023-06-29 NOTE — REASON FOR VISIT
[Subsequent Evaluation] : a subsequent evaluation for [FreeTextEntry2] : f/u post excisional lymph node bx

## 2023-07-03 ENCOUNTER — APPOINTMENT (OUTPATIENT)
Dept: OTOLARYNGOLOGY | Facility: CLINIC | Age: 63
End: 2023-07-03

## 2023-09-01 ENCOUNTER — APPOINTMENT (OUTPATIENT)
Dept: RADIATION ONCOLOGY | Facility: CLINIC | Age: 63
End: 2023-09-01
Payer: COMMERCIAL

## 2023-09-01 VITALS
DIASTOLIC BLOOD PRESSURE: 80 MMHG | HEART RATE: 87 BPM | HEIGHT: 63 IN | OXYGEN SATURATION: 97 % | SYSTOLIC BLOOD PRESSURE: 110 MMHG | BODY MASS INDEX: 25.16 KG/M2 | RESPIRATION RATE: 18 BRPM | WEIGHT: 142 LBS

## 2023-09-01 DIAGNOSIS — Z60.2 PROBLEMS RELATED TO LIVING ALONE: ICD-10-CM

## 2023-09-01 PROCEDURE — 99204 OFFICE O/P NEW MOD 45 MIN: CPT

## 2023-09-01 RX ORDER — BUPROPION HYDROCHLORIDE 100 MG/1
TABLET, FILM COATED ORAL
Refills: 0 | Status: ACTIVE | COMMUNITY

## 2023-09-01 RX ORDER — CHOLECALCIFEROL (VITAMIN D3) 25 MCG
TABLET ORAL
Refills: 0 | Status: ACTIVE | COMMUNITY

## 2023-09-01 RX ORDER — ROSUVASTATIN CALCIUM 5 MG/1
TABLET, FILM COATED ORAL
Refills: 0 | Status: ACTIVE | COMMUNITY

## 2023-09-01 RX ORDER — LAMOTRIGINE 25 MG/1
TABLET, ORALLY DISINTEGRATING ORAL
Refills: 0 | Status: ACTIVE | COMMUNITY

## 2023-09-01 RX ORDER — ZOLPIDEM TARTRATE 5 MG/1
TABLET, FILM COATED ORAL
Refills: 0 | Status: ACTIVE | COMMUNITY

## 2023-09-01 RX ORDER — ALPRAZOLAM 2 MG/1
TABLET ORAL
Refills: 0 | Status: ACTIVE | COMMUNITY

## 2023-09-01 RX ORDER — BREXPIPRAZOLE 4 MG/1
TABLET ORAL
Refills: 0 | Status: ACTIVE | COMMUNITY

## 2023-09-01 SDOH — SOCIAL STABILITY - SOCIAL INSECURITY: PROBLEMS RELATED TO LIVING ALONE: Z60.2

## 2023-09-01 NOTE — VITALS
[Maximal Pain Intensity: 0/10] : 0/10 [NoTreatment Scheduled] : no treatment scheduled [Date: ____________] : Patient's last distress assessment performed on [unfilled]. [8 - Distress Level] : Distress Level: 8 [Referred Patient  to social work for follow-up] : Patient was referred to social work for follow-up [Patient given social work contact information and resource sheet] : Patient was given social work contact information and resource sheet [80: Normal activity with effort; some signs or symptoms of disease.] : 80: Normal activity with effort; some signs or symptoms of disease.  [FreeTextEntry7] : Patient is seeing therapist and refuses SW at this time

## 2023-09-01 NOTE — HISTORY OF PRESENT ILLNESS
[FreeTextEntry1] : The patient is a pleasant 62 year old female diagnosed with follicular lymphoma.  Patient had noted right posterior neck mass in January 2023 and she was referred for evaluation of right cervical lymphadenopathy.  1/23/23 FNA of right posterior cervical adenopathy ([facility]) : atypical , mixed population of predominantly small lymphocytes with irregular nuclear contour.  Flow cytometry : atypical population of CD10 + ( CD 20 +, CD 5 -; CD23 -) B cells ( 47%) c/w atypical CD10 lymphoproliferative disorder.  PET/CT 1/28/23 showed atypical small lymph node foci involving the right posterior upper and lower neck SUV 4.4 measuring 1.0 cm and SUV 2.8 measuring 0.9 cm.  Also left inguinal region uptake max SUV 2.7 corresponding to prominent LN 1.1 cm. Mildly asymmetric tonsillar uptake SUV 5.3 likely due to the underlying lymphoproliferative/reactive disorder. Right adnexal 4.7 cm cyst, pelvic sono with transvaginal technique suggested.  US guided biopsy performed ay [facility] on 2/21/23 demonstrated possibility of B cell lymphoma of follicle center cell derivation,   B cell follicular Lymphoma, confirmed by verification path and flow cytometry.  Excisional biopsy of right posterior neck lymph node on 3/16/23 by Dr. Pendleton demonstrated  Follicular Lymphoma, low grade.  Patient states she was also seen by ENT and was told her tonsils are WNL.  Patient saw Dr. Hawkins on 7/28/23 for second opinion.  She reports some fatigue but has alot of stress in her life.  She lives alone in [place] and her significant other of 18 years lives in the same complex. She works as a  i [employer].  Her son passed away 3 yrs ago and she has one daughter Singh who works as an NP in [employer] ([employer], [place]). She presents today to discuss the role of radiation therapy in her care.

## 2023-09-01 NOTE — PHYSICAL EXAM
[Heart Sounds] : normal S1 and S2 [Cervical Lymph Nodes Enlarged Posterior Bilaterally] : posterior cervical [Cervical Lymph Nodes Enlarged Anterior Bilaterally] : anterior cervical [Supraclavicular Lymph Nodes Enlarged Bilaterally] : supraclavicular [Axillary Lymph Nodes Enlarged Bilaterally] : axillary [Normal] : oriented to person, place and time, the affect was normal, the mood was normal and not anxious [de-identified] : healed 2cm scar R lateral nape of neck

## 2023-09-01 NOTE — REVIEW OF SYSTEMS
[Fatigue] : fatigue [Insomnia] : insomnia [Anxiety] : anxiety [Depression] : depression [Negative] : Allergic/Immunologic

## 2023-09-06 ENCOUNTER — NON-APPOINTMENT (OUTPATIENT)
Age: 63
End: 2023-09-06

## 2023-09-25 ENCOUNTER — OUTPATIENT (OUTPATIENT)
Dept: OUTPATIENT SERVICES | Facility: HOSPITAL | Age: 63
LOS: 1 days | Discharge: HOME | End: 2023-09-25

## 2023-09-25 DIAGNOSIS — Z98.890 OTHER SPECIFIED POSTPROCEDURAL STATES: Chronic | ICD-10-CM

## 2023-09-25 DIAGNOSIS — R22.1 LOCALIZED SWELLING, MASS AND LUMP, NECK: ICD-10-CM

## 2023-09-29 ENCOUNTER — APPOINTMENT (OUTPATIENT)
Dept: HEMATOLOGY ONCOLOGY | Facility: CLINIC | Age: 63
End: 2023-09-29
Payer: COMMERCIAL

## 2023-09-29 DIAGNOSIS — C82.90 FOLLICULAR LYMPHOMA, UNSPECIFIED, UNSPECIFIED SITE: ICD-10-CM

## 2023-09-29 LAB — SARS-COV-2 N GENE NPH QL NAA+PROBE: NOT DETECTED

## 2023-09-29 PROCEDURE — XXXXX: CPT | Mod: 1L

## 2023-11-06 ENCOUNTER — APPOINTMENT (OUTPATIENT)
Dept: OBGYN | Facility: CLINIC | Age: 63
End: 2023-11-06

## 2023-12-19 NOTE — HISTORY OF PRESENT ILLNESS
[de-identified] : 61 y/o female noticed slightly  swollen/ palpable  glands in her posterior right neck for few months. Not getting larger, no pain. Has no throat , ear or dental issues.  No fevers. No night sweat. No weight loss. No pruritus.  C/o chronic fatigue for over one year. Partially contributes to depression since she lost her son three years ago. Has intermitted headaches.for several months. She is a smoker 1/2 PPD for at least 40 years. Has mild chronic nonproductive cough.   1/23/23 FNA of right posterior cervical adenopathy  ( done at [facility]) : atypical , mixed population of predominantly small lymphocytes with irregular nuclear contour.              Flow cytometry : atypical population of CD10 + ( CD 20 +,  CD 5 -; CD23 -)  B cells ( 47%)  c/w atypical CD10 lymphoproliferative disorder.  PET scan 1/28/23 ( [facility])  atypical small LNs right posterior neck ( 1- 0.9 cm with SUV 4.4-2.8) and left inguinal ( 1.1 cm SUV 2.7 )   4.7 cm R adnexal cyst.   3/16/23 right posterior neck node excisional biopsy : follicular lymphoma low grade  [de-identified] : feels OK. BP much better since meds changed. Has no c/o except occasional sore throat in the morning ( had full ENT exam recently)

## 2023-12-19 NOTE — REASON FOR VISIT
[Follow-Up Visit] : a follow-up visit for [FreeTextEntry2] : B cell lymphoma , here to discuss results of excisional LN biopsy

## 2023-12-19 NOTE — ASSESSMENT
[FreeTextEntry1] : 61 y/o female with  small  right cervical adenopathy. FNA showed monoclonal  B cell population.\par PET scan confirmed  low volume and very mildly avid R cervical adenopathy and small avid left inguinal adenopathy. \par Excisional biopsy showed low grade ( G1-2) follicular lymphoma.\par Stage III ( above and below diaphragm ) .\par Discussed diagnosis, monitoring strategy.  No role for early treatment intervention.\par Plan - exam / blood work and imaging every 6-12 months.\par Plan for CT neck/chest / abd and pelvis with iv contrast in 6 months ( September 2023) ( goes to [facility]).\par \par Return visit in September after scans/ sooner PRN.\par \par Encouraged smoking cessation.\par \par D/w the patient  and her daughter Ирина ( medicine/ cardiology NP) \par \par

## 2023-12-19 NOTE — REVIEW OF SYSTEMS
[Patient Intake Form Reviewed] : Patient intake form was reviewed [Cough] : cough [Depression] : depression [Negative] : Neurological [Fatigue] : no fatigue [Swollen Glands] : no swollen glands

## 2023-12-19 NOTE — PHYSICAL EXAM
[Fully active, able to carry on all pre-disease performance without restriction] : Status 0 - Fully active, able to carry on all pre-disease performance without restriction [Normal] : grossly intact [de-identified] : s/p excisional bx R posterior cervical LN , healed scar, no overt palpable IESHA  [de-identified] : small  firm R posterior  cervical LN,  inguinal LN  soft and small  [de-identified] : appears depressed

## 2024-07-17 ENCOUNTER — OUTPATIENT (OUTPATIENT)
Dept: OUTPATIENT SERVICES | Facility: HOSPITAL | Age: 64
LOS: 1 days | Discharge: HOME | End: 2024-07-17

## 2024-07-17 ENCOUNTER — NON-APPOINTMENT (OUTPATIENT)
Age: 64
End: 2024-07-17

## 2024-07-17 DIAGNOSIS — Z98.890 OTHER SPECIFIED POSTPROCEDURAL STATES: Chronic | ICD-10-CM

## 2024-07-17 DIAGNOSIS — R22.1 LOCALIZED SWELLING, MASS AND LUMP, NECK: ICD-10-CM

## 2024-07-18 ENCOUNTER — RESULT REVIEW (OUTPATIENT)
Age: 64
End: 2024-07-18

## 2024-07-18 ENCOUNTER — APPOINTMENT (OUTPATIENT)
Dept: HEMATOLOGY ONCOLOGY | Facility: CLINIC | Age: 64
End: 2024-07-18

## 2024-07-18 LAB
ALBUMIN SERPL ELPH-MCNC: 4.3 G/DL — SIGNIFICANT CHANGE UP (ref 3.3–5)
ALP SERPL-CCNC: 64 U/L — SIGNIFICANT CHANGE UP (ref 40–120)
ALT FLD-CCNC: 22 U/L — SIGNIFICANT CHANGE UP (ref 10–45)
ANION GAP SERPL CALC-SCNC: 10 MMOL/L — SIGNIFICANT CHANGE UP (ref 5–17)
AST SERPL-CCNC: 19 U/L — SIGNIFICANT CHANGE UP (ref 10–40)
BASOPHILS # BLD AUTO: 0.05 K/UL — SIGNIFICANT CHANGE UP (ref 0–0.2)
BASOPHILS NFR BLD AUTO: 0.7 % — SIGNIFICANT CHANGE UP (ref 0–2)
BILIRUB SERPL-MCNC: 0.3 MG/DL — SIGNIFICANT CHANGE UP (ref 0.2–1.2)
BUN SERPL-MCNC: 19 MG/DL — SIGNIFICANT CHANGE UP (ref 7–23)
CALCIUM SERPL-MCNC: 9.6 MG/DL — SIGNIFICANT CHANGE UP (ref 8.4–10.5)
CHLORIDE SERPL-SCNC: 97 MMOL/L — SIGNIFICANT CHANGE UP (ref 96–108)
CO2 SERPL-SCNC: 29 MMOL/L — SIGNIFICANT CHANGE UP (ref 22–31)
CREAT SERPL-MCNC: 1.16 MG/DL — SIGNIFICANT CHANGE UP (ref 0.5–1.3)
EGFR: 53 ML/MIN/1.73M2 — LOW
EOSINOPHIL # BLD AUTO: 0.13 K/UL — SIGNIFICANT CHANGE UP (ref 0–0.5)
EOSINOPHIL NFR BLD AUTO: 1.8 % — SIGNIFICANT CHANGE UP (ref 0–6)
GLUCOSE SERPL-MCNC: 111 MG/DL — HIGH (ref 70–99)
HCT VFR BLD CALC: 37.2 % — SIGNIFICANT CHANGE UP (ref 34.5–45)
HGB BLD-MCNC: 12.7 G/DL — SIGNIFICANT CHANGE UP (ref 11.5–15.5)
IMM GRANULOCYTES NFR BLD AUTO: 0.3 % — SIGNIFICANT CHANGE UP (ref 0–0.9)
LDH SERPL L TO P-CCNC: 132 U/L — SIGNIFICANT CHANGE UP (ref 50–242)
LYMPHOCYTES # BLD AUTO: 2.1 K/UL — SIGNIFICANT CHANGE UP (ref 1–3.3)
LYMPHOCYTES # BLD AUTO: 29.1 % — SIGNIFICANT CHANGE UP (ref 13–44)
MCHC RBC-ENTMCNC: 29.7 PG — SIGNIFICANT CHANGE UP (ref 27–34)
MCHC RBC-ENTMCNC: 34.1 GM/DL — SIGNIFICANT CHANGE UP (ref 32–36)
MCV RBC AUTO: 87.1 FL — SIGNIFICANT CHANGE UP (ref 80–100)
MONOCYTES # BLD AUTO: 0.52 K/UL — SIGNIFICANT CHANGE UP (ref 0–0.9)
MONOCYTES NFR BLD AUTO: 7.2 % — SIGNIFICANT CHANGE UP (ref 2–14)
NEUTROPHILS # BLD AUTO: 4.39 K/UL — SIGNIFICANT CHANGE UP (ref 1.8–7.4)
NEUTROPHILS NFR BLD AUTO: 60.9 % — SIGNIFICANT CHANGE UP (ref 43–77)
NRBC # BLD: 0 /100 WBCS — SIGNIFICANT CHANGE UP (ref 0–0)
PLATELET # BLD AUTO: 262 K/UL — SIGNIFICANT CHANGE UP (ref 150–400)
POTASSIUM SERPL-MCNC: 4 MMOL/L — SIGNIFICANT CHANGE UP (ref 3.5–5.3)
POTASSIUM SERPL-SCNC: 4 MMOL/L — SIGNIFICANT CHANGE UP (ref 3.5–5.3)
PROT SERPL-MCNC: 6.8 G/DL — SIGNIFICANT CHANGE UP (ref 6–8.3)
RBC # BLD: 4.27 M/UL — SIGNIFICANT CHANGE UP (ref 3.8–5.2)
RBC # FLD: 12.9 % — SIGNIFICANT CHANGE UP (ref 10.3–14.5)
SODIUM SERPL-SCNC: 136 MMOL/L — SIGNIFICANT CHANGE UP (ref 135–145)
WBC # BLD: 7.21 K/UL — SIGNIFICANT CHANGE UP (ref 3.8–10.5)
WBC # FLD AUTO: 7.21 K/UL — SIGNIFICANT CHANGE UP (ref 3.8–10.5)

## 2024-08-20 ENCOUNTER — APPOINTMENT (OUTPATIENT)
Dept: HEMATOLOGY ONCOLOGY | Facility: CLINIC | Age: 64
End: 2024-08-20
Payer: COMMERCIAL

## 2024-08-20 VITALS
WEIGHT: 142 LBS | HEIGHT: 63 IN | DIASTOLIC BLOOD PRESSURE: 97 MMHG | BODY MASS INDEX: 25.16 KG/M2 | TEMPERATURE: 97.3 F | OXYGEN SATURATION: 99 % | HEART RATE: 85 BPM | SYSTOLIC BLOOD PRESSURE: 170 MMHG

## 2024-08-20 DIAGNOSIS — C82.90 FOLLICULAR LYMPHOMA, UNSPECIFIED, UNSPECIFIED SITE: ICD-10-CM

## 2024-08-20 PROCEDURE — 99214 OFFICE O/P EST MOD 30 MIN: CPT

## 2024-08-21 DIAGNOSIS — C82.90 FOLLICULAR LYMPHOMA, UNSPECIFIED, UNSPECIFIED SITE: ICD-10-CM

## 2024-08-21 NOTE — PHYSICAL EXAM
[Fully active, able to carry on all pre-disease performance without restriction] : Status 0 - Fully active, able to carry on all pre-disease performance without restriction [Normal] : grossly intact [de-identified] : small  < 1 cm R posterior  cervical LN,  inguinal LN  soft and small  [de-identified] : appears depressed

## 2024-08-21 NOTE — ASSESSMENT
[FreeTextEntry1] : 64 y/o female with  small  right cervical adenopathy. FNA showed monoclonal  B cell population. PET scan confirmed  low volume and very mildly avid R cervical adenopathy and small avid left inguinal adenopathy.  Excisional biopsy showed low grade ( G1-2) follicular lymphoma. Stage III ( above and below diaphragm ) . Discussed again diagnosis, monitoring strategy.  No role for early treatment intervention. Plan - exam / blood work periodically. Scans only as clinically indicated.   Currently no evidence of disease progression. Chronic fatigue- suspect related to underlying depression which is not well controlled.   She will take BP meds today morning and follow up with PCP for monitoring. She will schedule appt with GYN ( due for exam,  follow up on R adnexal cyst) - she will call Dr Todd office today to schedule.  Follows with  psychiatry / therapist for depression. On three antidepressant meds- but not helping enough. ,  Return visit in 6 months/ sooner PRN.  Encouraged smoking cessation.

## 2024-08-21 NOTE — HISTORY OF PRESENT ILLNESS
[de-identified] : 63 y/o female noticed slightly  swollen/ palpable  glands in her posterior right neck for few months- in 2022. Not getting larger, no pain. Has no throat , ear or dental issues.  No fevers. No night sweat. No weight loss. No pruritus.  C/o chronic fatigue for over one year. Partially contributes to depression since she lost her son three years ago. Has intermitted headaches for several months. She is a smoker 1/2 PPD for at least 40 years. Has mild chronic nonproductive cough.   1/23/23 FNA of right posterior cervical adenopathy  ( done at [facility]) : atypical , mixed population of predominantly small lymphocytes with irregular nuclear contour.              Flow cytometry : atypical population of CD10 + ( CD 20 +,  CD 5 -; CD23 -)  B cells ( 47%)  c/w atypical CD10 lymphoproliferative disorder.  PET scan 1/28/23 ( [facility])  atypical small LNs right posterior neck ( 1- 0.9 cm with SUV 4.4-2.8) and left inguinal ( 1.1 cm SUV 2.7 )   4.7 cm R adnexal cyst.   3/16/23 right posterior neck node excisional biopsy : follicular lymphoma low grade   Monitored  [de-identified] : Chronic fatigue- not better.  NO fevers night sweats , weight stable.  Smoker- cut back to 5 ciggs per day. Did not take BP meds last night ( run out)  Did not follow up with GYN

## 2024-08-21 NOTE — HISTORY OF PRESENT ILLNESS
[de-identified] : 61 y/o female noticed slightly  swollen/ palpable  glands in her posterior right neck for few months- in 2022. Not getting larger, no pain. Has no throat , ear or dental issues.  No fevers. No night sweat. No weight loss. No pruritus.  C/o chronic fatigue for over one year. Partially contributes to depression since she lost her son three years ago. Has intermitted headaches for several months. She is a smoker 1/2 PPD for at least 40 years. Has mild chronic nonproductive cough.   1/23/23 FNA of right posterior cervical adenopathy  ( done at [facility]) : atypical , mixed population of predominantly small lymphocytes with irregular nuclear contour.              Flow cytometry : atypical population of CD10 + ( CD 20 +,  CD 5 -; CD23 -)  B cells ( 47%)  c/w atypical CD10 lymphoproliferative disorder.  PET scan 1/28/23 ( [facility])  atypical small LNs right posterior neck ( 1- 0.9 cm with SUV 4.4-2.8) and left inguinal ( 1.1 cm SUV 2.7 )   4.7 cm R adnexal cyst.   3/16/23 right posterior neck node excisional biopsy : follicular lymphoma low grade   Monitored  [de-identified] : Chronic fatigue- not better.  NO fevers night sweats , weight stable.  Smoker- cut back to 5 ciggs per day. Did not take BP meds last night ( run out)  Did not follow up with GYN

## 2024-08-21 NOTE — REVIEW OF SYSTEMS
[Diarrhea: Grade 0] : Diarrhea: Grade 0 [Anxiety] : anxiety [Depression] : depression [Negative] : Heme/Lymph

## 2024-08-21 NOTE — PHYSICAL EXAM
[Fully active, able to carry on all pre-disease performance without restriction] : Status 0 - Fully active, able to carry on all pre-disease performance without restriction [Normal] : grossly intact [de-identified] : small  < 1 cm R posterior  cervical LN,  inguinal LN  soft and small  [de-identified] : appears depressed

## 2024-11-22 ENCOUNTER — APPOINTMENT (OUTPATIENT)
Dept: ORTHOPEDIC SURGERY | Facility: CLINIC | Age: 64
End: 2024-11-22
Payer: COMMERCIAL

## 2024-11-22 VITALS
SYSTOLIC BLOOD PRESSURE: 142 MMHG | DIASTOLIC BLOOD PRESSURE: 91 MMHG | BODY MASS INDEX: 25.16 KG/M2 | WEIGHT: 142 LBS | HEIGHT: 63 IN | HEART RATE: 82 BPM

## 2024-11-22 DIAGNOSIS — M71.21 SYNOVIAL CYST OF POPLITEAL SPACE [BAKER], RIGHT KNEE: ICD-10-CM

## 2024-11-22 DIAGNOSIS — M17.11 UNILATERAL PRIMARY OSTEOARTHRITIS, RIGHT KNEE: ICD-10-CM

## 2024-11-22 PROCEDURE — 73564 X-RAY EXAM KNEE 4 OR MORE: CPT | Mod: RT

## 2024-11-22 PROCEDURE — 99203 OFFICE O/P NEW LOW 30 MIN: CPT | Mod: 25

## 2024-11-22 PROCEDURE — 20610 DRAIN/INJ JOINT/BURSA W/O US: CPT | Mod: RT

## 2024-11-22 RX ORDER — MELOXICAM 15 MG/1
15 TABLET ORAL
Qty: 30 | Refills: 1 | Status: ACTIVE | COMMUNITY
Start: 2024-11-22 | End: 1900-01-01

## 2025-02-21 ENCOUNTER — OUTPATIENT (OUTPATIENT)
Dept: OUTPATIENT SERVICES | Facility: HOSPITAL | Age: 65
LOS: 1 days | Discharge: HOME | End: 2025-02-21

## 2025-02-21 DIAGNOSIS — Z98.890 OTHER SPECIFIED POSTPROCEDURAL STATES: Chronic | ICD-10-CM

## 2025-02-21 DIAGNOSIS — C82.90 FOLLICULAR LYMPHOMA, UNSPECIFIED, UNSPECIFIED SITE: ICD-10-CM

## 2025-02-21 DIAGNOSIS — R22.1 LOCALIZED SWELLING, MASS AND LUMP, NECK: ICD-10-CM

## 2025-02-24 ENCOUNTER — RX RENEWAL (OUTPATIENT)
Age: 65
End: 2025-02-24

## 2025-02-25 ENCOUNTER — APPOINTMENT (OUTPATIENT)
Dept: HEMATOLOGY ONCOLOGY | Facility: CLINIC | Age: 65
End: 2025-02-25

## 2025-06-02 ENCOUNTER — RX RENEWAL (OUTPATIENT)
Age: 65
End: 2025-06-02

## 2025-07-28 ENCOUNTER — RX RENEWAL (OUTPATIENT)
Age: 65
End: 2025-07-28

## (undated) DEVICE — WARMING BLANKET LOWER ADULT

## (undated) DEVICE — ELCTR GROUNDING PAD ADULT COVIDIEN

## (undated) DEVICE — SUT VICRYL 3-0 27" SH UNDYED

## (undated) DEVICE — GLV 8 PROTEXIS (WHITE)

## (undated) DEVICE — BIPOLAR FORCEP KIRWAN JEWELERS STR 4" X 0.4MM W 12FT CORD (GREEN)

## (undated) DEVICE — PREP BETADINE SPONGE STICKS

## (undated) DEVICE — PACK HEAD & NECK

## (undated) DEVICE — WARMING BLANKET UPPER ADULT

## (undated) DEVICE — CANISTER DISPOSABLE THIN WALL 3000CC

## (undated) DEVICE — DRAPE 1/2 SHEET 40X57"

## (undated) DEVICE — SPONGE PEANUT AUTO COUNT

## (undated) DEVICE — ELCTR BOVIE PENCIL SMOKE EVACUATION

## (undated) DEVICE — RUBBER BANDS STERILE

## (undated) DEVICE — VENODYNE/SCD SLEEVE CALF MEDIUM

## (undated) DEVICE — LABELS BLANK W PEN

## (undated) DEVICE — DRAPE 3/4 SHEET 52X76"

## (undated) DEVICE — DRAIN JACKSON PRATT 7MM FLAT FULL NO TROCAR

## (undated) DEVICE — DRAPE TOWEL BLUE 17" X 24"

## (undated) DEVICE — TONSIL ROLLS

## (undated) DEVICE — SOL IRR POUR H2O 500ML

## (undated) DEVICE — DRSG BENZOIN 0.6CC

## (undated) DEVICE — PROTECTOR HEEL / ELBOW FLUFFY

## (undated) DEVICE — DRAPE MAGNETIC INSTRUMENT MEDIUM

## (undated) DEVICE — LAP PAD W RING 18 X 18"

## (undated) DEVICE — GLV 8 PROTEXIS (CREAM) MICRO

## (undated) DEVICE — SUT SILK 2-0 30" SH

## (undated) DEVICE — WARMING BLANKET FULL ADULT

## (undated) DEVICE — LIJ/LIA-ESU VALLEYLAB FORCETRIAD T2D28932EX: Type: DURABLE MEDICAL EQUIPMENT

## (undated) DEVICE — ELCTR BOVIE TIP NEEDLE INSULATED 2.8" EDGE

## (undated) DEVICE — SUT BIOSYN 4-0 18" P-12

## (undated) DEVICE — GLV 8.5 PROTEXIS (WHITE)

## (undated) DEVICE — SOL IRR POUR NS 0.9% 500ML

## (undated) DEVICE — DRAPE SPLIT SHEET 77" X 120"

## (undated) DEVICE — STAPLER SKIN VISI-STAT 35 WIDE

## (undated) DEVICE — SOL IRR POUR H2O 1500ML

## (undated) DEVICE — DRAIN RESERVOIR FOR JACKSON PRATT 100CC CARDINAL